# Patient Record
Sex: MALE | Race: WHITE | URBAN - METROPOLITAN AREA
[De-identification: names, ages, dates, MRNs, and addresses within clinical notes are randomized per-mention and may not be internally consistent; named-entity substitution may affect disease eponyms.]

---

## 2022-09-02 ENCOUNTER — HOSPITAL ENCOUNTER (INPATIENT)
Age: 32
LOS: 5 days | Discharge: HOME | DRG: 885 | End: 2022-09-07
Attending: PSYCHIATRY & NEUROLOGY | Admitting: PSYCHIATRY & NEUROLOGY
Payer: MEDICARE

## 2022-09-02 PROBLEM — F29 PSYCHOSIS, UNSPECIFIED PSYCHOSIS TYPE (HCC): Status: ACTIVE | Noted: 2022-09-02

## 2022-09-02 PROCEDURE — 99221 1ST HOSP IP/OBS SF/LOW 40: CPT

## 2022-09-02 PROCEDURE — 6370000000 HC RX 637 (ALT 250 FOR IP)

## 2022-09-02 PROCEDURE — 1240000000 HC EMOTIONAL WELLNESS R&B

## 2022-09-02 RX ORDER — DIPHENHYDRAMINE HYDROCHLORIDE 50 MG/ML
50 INJECTION INTRAMUSCULAR; INTRAVENOUS EVERY 4 HOURS PRN
Status: DISCONTINUED | OUTPATIENT
Start: 2022-09-02 | End: 2022-09-07 | Stop reason: HOSPADM

## 2022-09-02 RX ORDER — ERYTHROMYCIN 5 MG/G
50 OINTMENT OPHTHALMIC 4 TIMES DAILY
Status: ON HOLD | COMMUNITY
Start: 2022-08-30 | End: 2022-09-07 | Stop reason: HOSPADM

## 2022-09-02 RX ORDER — CARBOXYMETHYLCELLULOSE SODIUM 10 MG/ML
1 GEL OPHTHALMIC EVERY 4 HOURS PRN
Status: DISCONTINUED | OUTPATIENT
Start: 2022-09-02 | End: 2022-09-07 | Stop reason: HOSPADM

## 2022-09-02 RX ORDER — CIPROFLOXACIN HYDROCHLORIDE 3.5 MG/ML
1 SOLUTION/ DROPS TOPICAL 3 TIMES DAILY
Status: DISCONTINUED | OUTPATIENT
Start: 2022-09-02 | End: 2022-09-07 | Stop reason: HOSPADM

## 2022-09-02 RX ORDER — TRAZODONE HYDROCHLORIDE 50 MG/1
50 TABLET ORAL NIGHTLY PRN
Status: DISCONTINUED | OUTPATIENT
Start: 2022-09-02 | End: 2022-09-07 | Stop reason: HOSPADM

## 2022-09-02 RX ORDER — HYDROXYZINE 50 MG/1
50 TABLET, FILM COATED ORAL 3 TIMES DAILY PRN
Status: DISCONTINUED | OUTPATIENT
Start: 2022-09-02 | End: 2022-09-07 | Stop reason: HOSPADM

## 2022-09-02 RX ORDER — IBUPROFEN 400 MG/1
400 TABLET ORAL EVERY 6 HOURS PRN
Status: DISCONTINUED | OUTPATIENT
Start: 2022-09-02 | End: 2022-09-07 | Stop reason: HOSPADM

## 2022-09-02 RX ORDER — MAGNESIUM HYDROXIDE/ALUMINUM HYDROXICE/SIMETHICONE 120; 1200; 1200 MG/30ML; MG/30ML; MG/30ML
30 SUSPENSION ORAL EVERY 6 HOURS PRN
Status: DISCONTINUED | OUTPATIENT
Start: 2022-09-02 | End: 2022-09-07 | Stop reason: HOSPADM

## 2022-09-02 RX ORDER — OLANZAPINE 5 MG/1
5 TABLET ORAL EVERY 6 HOURS PRN
Status: DISCONTINUED | OUTPATIENT
Start: 2022-09-02 | End: 2022-09-07 | Stop reason: HOSPADM

## 2022-09-02 RX ORDER — POLYETHYLENE GLYCOL 3350 17 G
2 POWDER IN PACKET (EA) ORAL
Status: DISCONTINUED | OUTPATIENT
Start: 2022-09-02 | End: 2022-09-07 | Stop reason: HOSPADM

## 2022-09-02 RX ADMIN — CIPROFLOXACIN 1 DROP: 3 SOLUTION OPHTHALMIC at 20:15

## 2022-09-02 RX ADMIN — OLANZAPINE 5 MG: 5 TABLET, FILM COATED ORAL at 21:05

## 2022-09-02 ASSESSMENT — SLEEP AND FATIGUE QUESTIONNAIRES
DO YOU HAVE DIFFICULTY SLEEPING: YES
SLEEP PATTERN: DIFFICULTY FALLING ASLEEP;DISTURBED/INTERRUPTED SLEEP;INSOMNIA;RESTLESSNESS
DO YOU USE A SLEEP AID: YES
AVERAGE NUMBER OF SLEEP HOURS: 1.5

## 2022-09-02 ASSESSMENT — LIFESTYLE VARIABLES
HOW OFTEN DO YOU HAVE A DRINK CONTAINING ALCOHOL: 2-4 TIMES A MONTH
HOW MANY STANDARD DRINKS CONTAINING ALCOHOL DO YOU HAVE ON A TYPICAL DAY: 1 OR 2

## 2022-09-02 NOTE — PROGRESS NOTES
`[facility]  Admission Note     Admission Type:   Admission Type:  Involuntary    Reason for admission:  Reason for Admission: Schizophrenia, Medication non compliance    PATIENT STRENGTHS:       Patient Strengths and Limitations: Non compliant       Addictive Behavior:   Addictive Behavior  In the Past 3 Months, Have You Felt or Has Someone Told You That You Have a Problem With  : None    Medical Problems:   Past Medical History:   Diagnosis Date    Poly-drug misuser     Schizophrenia ([facility] [address]        Status EXAM:  Mental Status and Behavioral Exam  Normal: No  Level of Assistance: Independent/Self  Facial Expression: Avoids Gaze  Affect: Incongruent, Unstable  Level of Consciousness: Alert  Frequency of Checks: 4 times per hour, close  Mood:Normal: No  Mood: Anxious, Suspicious, Irritable  Motor Activity:Normal: Yes  Eye Contact: Poor  Observed Behavior: Guarded, Preoccupied  Sexual Misconduct History: Current - no  Preception: [place] to person  Attention:Normal: No  Attention: Unable to concentrate  Thought Processes: Flight of ideas, Loose association  Thought Content:Normal: No  Thought Content: Delusions  Anxiety Symptoms: Generalized  Layla Symptoms: Flight of ideas  Hallucinations: None  Delusions: Yes  Delusions: Grandeur  Memory:Normal: No  Memory: Poor recent, Poor remote  Insight and Judgment: No  Insight and Judgment: Poor judgment, Poor insight, Unrealistic    Tobacco Screening:  Practical Counseling, on admission, kwadwo X, if applicable and completed (first 3 are required if patient doesn't refuse):            ( )  Recognizing danger situations (included triggers and roadblocks)                    ( )  Coping skills (new ways to manage stress, exercise, relaxation techniques, changing routine, distraction)                                                           ( )  Basic information about quitting (benefits of quitting, techniques in how to quit, available resources  ( ) Referral for counseling faxed to [facility]                                           ( ) Patient refused counseling  ( ) Patient has not smoked in the last 30 days    Metabolic Screening:    No results found for: LABA1C    No results for input(s): CHOL, TRIG, HDL, LDLCALC, LABVLDL in the last 72 hours. There is no height or weight on file to calculate BMI. BP Readings from Last 2 Encounters:   09/02/22 (!) 133/99   05/22/18 122/67           Pt admitted with followings belongings:  Dental Appliances: None  Vision - Corrective Lenses: Contact Lenses (no currently with patient)  Hearing Aid: None  Jewelry: None  Body Piercings Removed: N/A  Clothing: Pants, Socks  Other Valuables: Anjali Thayer, Cigarettes, Other (Comment) (a single dollarbill  and state ID)     Valuables placed in safe  Patient's home medications were not verified awaiting response. Patient oriented to surroundings and program expectations and copy of patient rights given. Received admission packet:  Consents reviewed,  not signed .  Refused   Travis Dempsey RN
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
## 2022-09-02 NOTE — PROGRESS NOTES
Called Charan Yanes to get information about pts pharmacy and home meds as well as gather Collateral awaiting call back

## 2022-09-03 PROBLEM — F20.3 UNDIFFERENTIATED SCHIZOPHRENIA (HCC): Status: ACTIVE | Noted: 2022-09-03

## 2022-09-03 LAB
CHOLESTEROL, TOTAL: 134 MG/DL (ref 0–199)
HDLC SERPL-MCNC: 38 MG/DL (ref 40–60)
LDL CHOLESTEROL CALCULATED: 80 MG/DL
TRIGL SERPL-MCNC: 78 MG/DL (ref 0–150)
TSH SERPL DL<=0.05 MIU/L-ACNC: 0.77 UIU/ML (ref 0.27–4.2)
VLDLC SERPL CALC-MCNC: 16 MG/DL

## 2022-09-03 PROCEDURE — 84443 ASSAY THYROID STIM HORMONE: CPT

## 2022-09-03 PROCEDURE — 6370000000 HC RX 637 (ALT 250 FOR IP): Performed by: PSYCHIATRY & NEUROLOGY

## 2022-09-03 PROCEDURE — 6370000000 HC RX 637 (ALT 250 FOR IP)

## 2022-09-03 PROCEDURE — 99223 1ST HOSP IP/OBS HIGH 75: CPT | Performed by: PSYCHIATRY & NEUROLOGY

## 2022-09-03 PROCEDURE — 83036 HEMOGLOBIN GLYCOSYLATED A1C: CPT

## 2022-09-03 PROCEDURE — 36415 COLL VENOUS BLD VENIPUNCTURE: CPT

## 2022-09-03 PROCEDURE — 6360000002 HC RX W HCPCS: Performed by: PSYCHIATRY & NEUROLOGY

## 2022-09-03 PROCEDURE — 1240000000 HC EMOTIONAL WELLNESS R&B

## 2022-09-03 PROCEDURE — 80061 LIPID PANEL: CPT

## 2022-09-03 RX ORDER — FLUPHENAZINE DECANOATE 25 MG/ML
50 INJECTION, SOLUTION INTRAMUSCULAR; SUBCUTANEOUS ONCE
Status: COMPLETED | OUTPATIENT
Start: 2022-09-03 | End: 2022-09-03

## 2022-09-03 RX ORDER — BUPROPION HYDROCHLORIDE 150 MG/1
150 TABLET ORAL DAILY
Status: DISCONTINUED | OUTPATIENT
Start: 2022-09-03 | End: 2022-09-07 | Stop reason: HOSPADM

## 2022-09-03 RX ORDER — BENZTROPINE MESYLATE 1 MG/1
1 TABLET ORAL 2 TIMES DAILY
Status: DISCONTINUED | OUTPATIENT
Start: 2022-09-03 | End: 2022-09-03

## 2022-09-03 RX ORDER — FLUPHENAZINE DECANOATE 25 MG/ML
25 INJECTION, SOLUTION INTRAMUSCULAR; SUBCUTANEOUS ONCE
Status: DISCONTINUED | OUTPATIENT
Start: 2022-09-03 | End: 2022-09-03

## 2022-09-03 RX ORDER — OLANZAPINE 10 MG/1
10 TABLET ORAL 2 TIMES DAILY
Status: DISCONTINUED | OUTPATIENT
Start: 2022-09-03 | End: 2022-09-07 | Stop reason: HOSPADM

## 2022-09-03 RX ORDER — BENZTROPINE MESYLATE 1 MG/1
2 TABLET ORAL NIGHTLY
Status: DISCONTINUED | OUTPATIENT
Start: 2022-09-03 | End: 2022-09-07 | Stop reason: HOSPADM

## 2022-09-03 RX ORDER — BENZTROPINE MESYLATE 1 MG/1
1 TABLET ORAL DAILY
Status: DISCONTINUED | OUTPATIENT
Start: 2022-09-04 | End: 2022-09-07 | Stop reason: HOSPADM

## 2022-09-03 RX ORDER — FLUPHENAZINE HYDROCHLORIDE 5 MG/1
5 TABLET ORAL 2 TIMES DAILY
Status: DISCONTINUED | OUTPATIENT
Start: 2022-09-03 | End: 2022-09-03

## 2022-09-03 RX ORDER — FLUPHENAZINE HYDROCHLORIDE 10 MG/1
10 TABLET ORAL NIGHTLY
Status: DISCONTINUED | OUTPATIENT
Start: 2022-09-03 | End: 2022-09-05

## 2022-09-03 RX ADMIN — CIPROFLOXACIN 1 DROP: 3 SOLUTION OPHTHALMIC at 08:49

## 2022-09-03 RX ADMIN — OLANZAPINE 10 MG: 10 TABLET, FILM COATED ORAL at 20:44

## 2022-09-03 RX ADMIN — BUPROPION HYDROCHLORIDE 150 MG: 150 TABLET ORAL at 16:06

## 2022-09-03 RX ADMIN — OLANZAPINE 5 MG: 5 TABLET, FILM COATED ORAL at 08:49

## 2022-09-03 RX ADMIN — FLUPHENAZINE DECANOATE 50 MG: 25 INJECTION, SOLUTION INTRAMUSCULAR; SUBCUTANEOUS at 11:24

## 2022-09-03 RX ADMIN — CIPROFLOXACIN 1 DROP: 3 SOLUTION OPHTHALMIC at 16:06

## 2022-09-03 RX ADMIN — FLUPHENAZINE HYDROCHLORIDE 10 MG: 10 TABLET, FILM COATED ORAL at 20:44

## 2022-09-03 RX ADMIN — NICOTINE POLACRILEX 2 MG: 2 LOZENGE ORAL at 08:49

## 2022-09-03 RX ADMIN — CARBOXYMETHYLCELLULOSE SODIUM 1 DROP: 10 GEL OPHTHALMIC at 18:30

## 2022-09-03 RX ADMIN — OLANZAPINE 5 MG: 5 TABLET, FILM COATED ORAL at 21:00

## 2022-09-03 RX ADMIN — HYDROXYZINE HYDROCHLORIDE 50 MG: 50 TABLET, FILM COATED ORAL at 19:36

## 2022-09-03 RX ADMIN — CIPROFLOXACIN 1 DROP: 3 SOLUTION OPHTHALMIC at 21:24

## 2022-09-03 RX ADMIN — OLANZAPINE 10 MG: 10 TABLET, FILM COATED ORAL at 16:06

## 2022-09-03 RX ADMIN — TRAZODONE HYDROCHLORIDE 50 MG: 50 TABLET ORAL at 20:44

## 2022-09-03 RX ADMIN — BENZTROPINE MESYLATE 2 MG: 1 TABLET ORAL at 21:00

## 2022-09-03 ASSESSMENT — PAIN SCALES - GENERAL: PAINLEVEL_OUTOF10: 0

## 2022-09-03 NOTE — PROGRESS NOTES
Behavioral Services  Medicare Certification Upon Admission    I certify that this patient's inpatient psychiatric hospital admission is medically necessary for:    [x] (1) Treatment which could reasonably be expected to improve this patient's condition,       [x] (2) Or for diagnostic study;     AND     [x](2) The inpatient psychiatric services are provided while the individual is under the care of a physician and are included in the individualized plan of care.     Estimated length of stay/service 3 d    Plan for post-hospital care outpt    Electronically signed by Caryn Corona MD on 9/3/2022 at 12:07 PM

## 2022-09-03 NOTE — PLAN OF CARE
Pt alert and oriented to self and time. He does not know where he is, except that he is in a hospital. He is anxious at times and requested Zyprexa. It was effective. Pt requested his Prolixin Decoanate and it was admin to his left deltoid. He laughs inappropriately at times. He is disorganized with flight of ideas. He denies AVH and has not been noted to be RTIS. He denies SI/HI. Problem: Anxiety  Goal: Will report anxiety at manageable levels  Description: INTERVENTIONS:  1. Administer medication as ordered  2. Teach and rehearse alternative coping skills  3. Provide emotional support with 1:1 interaction with staff  Outcome: Not Progressing  Flowsheets (Taken 9/3/2022 1307)  Will report anxiety at manageable levels:   Administer medication as ordered   Provide emotional support with 1:1 interaction with staff   Teach and rehearse alternative coping skills     Problem: Confusion  Goal: Confusion, delirium, dementia, or psychosis is improved or at baseline  Description: INTERVENTIONS:  1. Assess for possible contributors to thought disturbance, including medications, impaired vision or hearing, underlying metabolic abnormalities, dehydration, psychiatric diagnoses, and notify attending LIP  2. [place] high risk fall precautions, as indicated  3. Provide frequent short contacts to provide reality reorientation, refocusing and direction  4. Decrease environmental stimuli, including noise as appropriate  5. Monitor and intervene to maintain adequate nutrition, hydration, elimination, sleep and activity  6. If unable to ensure safety without constant attention obtain sitter and review sitter guidelines with assigned personnel  7.  Initiate Psychosocial CNS and Spiritual Care consult, as indicated  Outcome: Not Progressing  Flowsheets (Taken 9/3/2022 1307)  Effect of thought disturbance (confusion, delirium, dementia, or psychosis) are managed with adequate functional status: Assess for contributors to thought disturbance, including medications, impaired vision or hearing, underlying metabolic abnormalities, dehydration, psychiatric diagnoses, notify LIP     Problem: Infection - Adult  Goal: Absence of infection at discharge  Outcome: Not Progressing  Flowsheets (Taken 9/3/2022 1307)  Absence of infection at discharge:   Administer medications as ordered   Assess and monitor for signs and symptoms of infection   Instruct and encourage patient and family to use good hand hygiene technique  Goal: Absence of fever/infection during anticipated neutropenic period  Outcome: Not Progressing

## 2022-09-03 NOTE — PLAN OF CARE
Problem: Anxiety  Goal: Will report anxiety at manageable levels  Description: INTERVENTIONS:  1. Administer medication as ordered  2. Teach and rehearse alternative coping skills  3. Provide emotional support with 1:1 interaction with staff  Outcome: Progressing     Problem: Coping  Goal: Pt/Family able to verbalize concerns and demonstrate effective coping strategies  Description: INTERVENTIONS:  1. Assist patient/family to identify coping skills, available support systems and cultural and spiritual values  2. Provide emotional support, including active listening and acknowledgement of concerns of patient and caregivers  3. Reduce environmental stimuli, as able  4. Instruct patient/family in relaxation techniques, as appropriate  5. Assess for spiritual pain/suffering and initiate Spiritual Care, Psychosocial Clinical Specialist consults as needed  Outcome: Progressing     Problem: Behavior  Goal: Pt/Family maintain appropriate behavior and adhere to behavioral management agreement, if implemented  Description: INTERVENTIONS:  1. Assess patient/family's coping skills and  non-compliant behavior (including use of illegal substances)  2. Notify security of behavior or suspected illegal substances which indicate the need for search of the family and/or belongings  3. Encourage verbalization of thoughts and concerns in a socially appropriate manner  4. Utilize positive, consistent limit setting strategies supporting safety of patient, staff and others  5. Encourage participation in the decision making process about the behavioral management agreement  6. If a visitor's behavior poses a threat to safety call refer to organization policy. 7. Initiate consult with , Psychosocial CNS, Spiritual Care as appropriate  Outcome: Progressing     Problem: Depression/Self Harm  Goal: Effect of psychiatric condition will be minimized and patient will be protected from self harm  Description: INTERVENTIONS:  1. Assess impact of patient's symptoms on level of functioning, self care needs and offer support as indicated  2. Assess patient/family knowledge of depression, impact on illness and need for teaching  3. Provide emotional support, presence and reassurance  4. Assess for possible suicidal thoughts or ideation. If patient expresses suicidal thoughts or statements do not leave alone, initiate Suicide Precautions, move to a room close to the nursing station and obtain sitter  5. Initiate consults as appropriate with Mental Health Professional, Spiritual Care, Psychosocial CNS, and consider a recommendation to the LIP for a Psychiatric Consultation  Outcome: Progressing     Problem: Decision Making  Goal: Pt/Family able to effectively weigh alternatives and participate in decision making related to treatment and care  Description: INTERVENTIONS:  1. Determine when there are differences between patient's view, family's view, and healthcare provider's view of condition  2. Facilitate patient and family articulation of goals for care  3. Help patient and family identify pros/cons of alternative solutions  4. Provide information as requested by patient/family  5. Respect patient/family right to receive or not to receive information  6. Serve as a liaison between patient and family and health care team  7. Initiate Consults from Ethics, Palliative Care or initiate [facility] as is appropriate  Outcome: Not Progressing     Problem: Confusion  Goal: Confusion, delirium, dementia, or psychosis is improved or at baseline  Description: INTERVENTIONS:  1. Assess for possible contributors to thought disturbance, including medications, impaired vision or hearing, underlying metabolic abnormalities, dehydration, psychiatric diagnoses, and notify attending LIP  2. [place] high risk fall precautions, as indicated  3. Provide frequent short contacts to provide reality reorientation, refocusing and direction  4.  Decrease environmental stimuli, including noise as appropriate  5. Monitor and intervene to maintain adequate nutrition, hydration, elimination, sleep and activity  6. If unable to ensure safety without constant attention obtain sitter and review sitter guidelines with assigned personnel  7. Initiate Psychosocial CNS and Spiritual Care consult, as indicated  Outcome: Not Progressing   Pt blunt, cooperative. Isolative to self and med compliant. Administered first eye drops for pinkeye. Reminded about good hand hygiene due to infection. CFS, denies all.

## 2022-09-04 LAB
ESTIMATED AVERAGE GLUCOSE: 105.4 MG/DL
HBA1C MFR BLD: 5.3 %

## 2022-09-04 PROCEDURE — 6370000000 HC RX 637 (ALT 250 FOR IP)

## 2022-09-04 PROCEDURE — 6360000002 HC RX W HCPCS

## 2022-09-04 PROCEDURE — 6370000000 HC RX 637 (ALT 250 FOR IP): Performed by: PSYCHIATRY & NEUROLOGY

## 2022-09-04 PROCEDURE — 1240000000 HC EMOTIONAL WELLNESS R&B

## 2022-09-04 RX ORDER — LORAZEPAM 2 MG/1
2 TABLET ORAL ONCE
Status: COMPLETED | OUTPATIENT
Start: 2022-09-04 | End: 2022-09-04

## 2022-09-04 RX ORDER — POLYETHYLENE GLYCOL 3350 17 G/17G
17 POWDER, FOR SOLUTION ORAL DAILY
Status: DISCONTINUED | OUTPATIENT
Start: 2022-09-04 | End: 2022-09-07 | Stop reason: HOSPADM

## 2022-09-04 RX ADMIN — BENZTROPINE MESYLATE 2 MG: 1 TABLET ORAL at 20:13

## 2022-09-04 RX ADMIN — BUPROPION HYDROCHLORIDE 150 MG: 150 TABLET ORAL at 08:28

## 2022-09-04 RX ADMIN — OLANZAPINE 10 MG: 10 TABLET, FILM COATED ORAL at 20:13

## 2022-09-04 RX ADMIN — LORAZEPAM 2 MG: 2 TABLET ORAL at 20:13

## 2022-09-04 RX ADMIN — TRAZODONE HYDROCHLORIDE 50 MG: 50 TABLET ORAL at 20:13

## 2022-09-04 RX ADMIN — CIPROFLOXACIN 1 DROP: 3 SOLUTION OPHTHALMIC at 16:00

## 2022-09-04 RX ADMIN — HYDROXYZINE HYDROCHLORIDE 50 MG: 50 TABLET, FILM COATED ORAL at 11:26

## 2022-09-04 RX ADMIN — POLYETHYLENE GLYCOL (3350) 17 G: 17 POWDER, FOR SOLUTION ORAL at 12:02

## 2022-09-04 RX ADMIN — OLANZAPINE 5 MG: 5 TABLET, FILM COATED ORAL at 16:00

## 2022-09-04 RX ADMIN — NICOTINE POLACRILEX 2 MG: 2 LOZENGE ORAL at 11:34

## 2022-09-04 RX ADMIN — BENZTROPINE MESYLATE 1 MG: 1 TABLET ORAL at 08:28

## 2022-09-04 RX ADMIN — DIPHENHYDRAMINE HYDROCHLORIDE 50 MG: 50 INJECTION, SOLUTION INTRAMUSCULAR; INTRAVENOUS at 19:00

## 2022-09-04 RX ADMIN — CIPROFLOXACIN 1 DROP: 3 SOLUTION OPHTHALMIC at 08:29

## 2022-09-04 RX ADMIN — CIPROFLOXACIN 1 DROP: 3 SOLUTION OPHTHALMIC at 20:23

## 2022-09-04 RX ADMIN — FLUPHENAZINE HYDROCHLORIDE 10 MG: 10 TABLET, FILM COATED ORAL at 20:13

## 2022-09-04 RX ADMIN — OLANZAPINE 10 MG: 10 TABLET, FILM COATED ORAL at 08:28

## 2022-09-04 ASSESSMENT — PAIN SCALES - GENERAL: PAINLEVEL_OUTOF10: 0

## 2022-09-04 NOTE — PROGRESS NOTES
Patient was on the small side of the unit and utilized the phone. After phone call patient began pacing the unit, yelling he was becoming agitated and feeling violet. Nurse on small side guided him to large side to find his primary nurse. Patient became even more upset that nurse was following him. Patient on big side continuing to pace the unit, yelling, \"I am agitated, and feeling violet\". Distraction techniques utilized but were unsuccessful. Patient had Zyprexa at 1600. Patient requesting IM Thorazine. Thorazine not available for patient at this time. Benadryl IM offered and  patient did agree to this, but stated, \"Benadryl doesn't usually work for me. \"  IM Benadryl given per order. Provider notified. One time verbal order for Ativan 2 mg PO given.   See [facility]

## 2022-09-04 NOTE — PROGRESS NOTES
Time: 2100      Type of Group: wrap up group      Level of Participation: laughed inappropriately during group. Somewhat intrusive and did not pay attention to other speaking. Did not share goal but stayed in mileu during group time.       Comments: see above

## 2022-09-04 NOTE — H&P
[address]                 [address] [address]                              HISTORY AND PHYSICAL    PATIENT NAME: Rodri Flanagan                  :        1990  MED REC NO:   [id number]                          ROOM:       [id number]  ACCOUNT NO:   [de-identified]                           ADMIT DATE: 2022  PROVIDER:     Vince Pickett. Tre Galvez MD    CHIEF COMPLAINT:  Psychosis. HISTORY OF PRESENT ILLNESS:  The patient is a 82-year-old male with a  history of schizophrenia and lives in [place], [place]. He was  brought to [facility] by police after he had some type of an  outburst in a front yard, screaming, was going to rip off people's  heads. He was seen in the ED and was cleared and transferred to [facility] for  treatment. When I asked the patient about why he was at [facility] when he lives in  [place], he stated that he came there and was visiting family in [place]. It is not clear as to the details of that. Police had to come and get  him from that area for his behaviors. He stated that he has been in  outpatient treatment through [facility] and sees Dr. Mercedes Liao,  a psychiatrist, and is prescribed a medication. Apparently, he has been  off this medication for a month or so and has not followed up in  treatment as expected. The patient has minimal insight. He did not appear to understand why he  was in the hospital or what was going on that caused him to be in the  hospital.  He would act rather oddly at times and would suddenly burst  out laughing. When I asked him about drug use and alcohol use, he said  that he \"drank glue. \"  When I questioned that, he burst out laughing  very loudly and then said he was joking. He apparently does not have  any history of drug and alcohol abuse. MEDICAL HISTORY:  States he is healthy.     PRIOR PSYCHIATRIC TREATMENT:  Inpatient, he states he has been to  [facility] but not clear as to the details. He  had a difficult time giving information. Apparently, he has also been  to [address] for mental health treatment. Outpatient,  [facility] in [place], [place] with Dr. Martine Rivers. LEGAL ISSUES:  He denied. TRAUMA HISTORY:  He denied. SOCIAL HISTORY:  He lives in [place], [place] in an apartment. He  has been there for two years. REVIEW OF SYSTEMS: Pertinent positives on the HPI, otherwise negative. ALLERGIES:  HYDROCODONE. FAMILY PSYCH HISTORY:  Mother with depression. Mother with substance  abuse history. Brother with substance abuse history. VITAL SIGNS:  Temperature 98.1, pulse 105, respirations 18, blood  pressure 133/99. LABORATORIES:  Reviewed. Urine drug screen was negative. No alcohol. White count was 13.5. CURRENT MEDICATIONS:  Cogentin 1 mg in the morning and 2 mg at night,  Prilosec 40 mg in the morning. He takes Prolixin Decanoate 50 mg every  month IM, Prolixin 10 mg p.o. b.i.d., Zyprexa 10 mg daily. PAST MEDICATIONS:  Include Depakote, Risperdal, Seroquel, which he  denied taking at this time. These were verified through [facility]. DRUG AND ALCOHOL USE:  He apparently has a history of polysubstance  abuse, but not currently. MENTAL STATUS EXAMINATION:  The patient is a 46-year-old male. He  appears well nourished. He is in hospital gown. He made good eye  contact. Speech was normal rate and tone. Thoughts were relatively  logical at times, although he would go off on tangents of topics. He  denied any threats to harm self or others. Denied auditory or visual  hallucinations. Insight and judgment is impaired. Fund of knowledge  and language was fair. Attention and concentration was poor. He said  his mood was okay. Affect was labile with periods of silliness. DIAGNOSES:  Axis I:  1. Schizophrenia, undifferentiated type.   2.  Polysubstance abuse, in remission. Axis II:  Deferred. Axis III:  Unremarkable. Axis IV:  Severe. Axis V:  35. PLAN:  1. We will restart medication. Will receive 50 mg Prolixin Decanoate  today and continue that monthly. Prolixin p.o. 10 mg b.i.d., Zyprexa 10  mg daily, Cogentin 1 in the morning and 2 mg at night. 2.  In full-program.  3.  Goal for discharge is for the patient to be compliant with treatment  and show an improvement with overall function. 4.  Follow up in outpatient through [facility] with Dr. Tony Trammell. 5.  Estimated length of stay, 2-3 days. Spent approximately 70 minutes on this eval with more than 50% of the  time discussing patient care and treatment options.         Lafe Snellen, MD    D: 09/03/2022 17:01:13       T: 09/03/2022 17:06:24     [id number]  Job#: [id number]     Doc#: [id number]    CC:

## 2022-09-04 NOTE — PROGRESS NOTES
Patient came up to staff and informed them that he was tired. He was reminded to go to bed for sleep. Once activities ended for the night, Nitin Rush went straight to his room and fell asleep quickly. Medications are noted to be effective.

## 2022-09-04 NOTE — PROGRESS NOTES
Patient was noted to be in bed at this time. His eyes are closed and he is resting quietly in bed. Medication was finally effective. Patient enjoyed being with peers in the TV area, was getting tired but didn't want to go to bed until the activities of the day were completed.

## 2022-09-04 NOTE — PLAN OF CARE
Problem: Anxiety  Goal: Will report anxiety at manageable levels  Description: INTERVENTIONS:  1. Administer medication as ordered  2. Teach and rehearse alternative coping skills  3. Provide emotional support with 1:1 interaction with staff  Outcome: Progressing     Problem: Coping  Goal: Pt/Family able to verbalize concerns and demonstrate effective coping strategies  Description: INTERVENTIONS:  1. Assist patient/family to identify coping skills, available support systems and cultural and spiritual values  2. Provide emotional support, including active listening and acknowledgement of concerns of patient and caregivers  3. Reduce environmental stimuli, as able  4. Instruct patient/family in relaxation techniques, as appropriate  5. Assess for spiritual pain/suffering and initiate Spiritual Care, Psychosocial Clinical Specialist consults as needed  Outcome: Progressing     Problem: Confusion  Goal: Confusion, delirium, dementia, or psychosis is improved or at baseline  Description: INTERVENTIONS:  1. Assess for possible contributors to thought disturbance, including medications, impaired vision or hearing, underlying metabolic abnormalities, dehydration, psychiatric diagnoses, and notify attending LIP  2. [place] high risk fall precautions, as indicated  3. Provide frequent short contacts to provide reality reorientation, refocusing and direction  4. Decrease environmental stimuli, including noise as appropriate  5. Monitor and intervene to maintain adequate nutrition, hydration, elimination, sleep and activity  6. If unable to ensure safety without constant attention obtain sitter and review sitter guidelines with assigned personnel  7. Initiate Psychosocial CNS and Spiritual Care consult, as indicated  Outcome: Progressing     Problem: Depression/Self Harm  Goal: Effect of psychiatric condition will be minimized and patient will be protected from self harm  Description: INTERVENTIONS:  1.  Assess impact of

## 2022-09-04 NOTE — PLAN OF CARE
Pt is alert and oriented X3. He is compliant with medications this am. He states he does not know why he is here. He has a flat affect at times and is slow to answer questions. He is irritable at times and requested \"something to make my legs stop walking. Atarax was admin. Pt c/o constipation and MD was notified. Miralax ordered and admin. He denies SI/HI/AVH, and has not been noted to be RTIS. Problem: Anxiety  Goal: Will report anxiety at manageable levels  Description: INTERVENTIONS:  1. Administer medication as ordered  2. Teach and rehearse alternative coping skills  3.  Provide emotional support with 1:1 interaction with staff  9/4/2022 1118 by Yojana Rivera RN  Outcome: Not Progressing  Flowsheets (Taken 9/4/2022 1110)  Will report anxiety at manageable levels:   Administer medication as ordered   Provide emotional support with 1:1 interaction with staff   Teach and rehearse alternative coping skills  9/4/2022 0549 by Yakov Castro RN  Outcome: Progressing

## 2022-09-04 NOTE — PROGRESS NOTES
Patient had been noted to come out of room, walk into dining room and standing there and growling at peers or laughing in a dramatic fashion. He was medication compliant, he requested additional zyprexa 5 mg po at 2100 to help with his agitation. He was provided this, was able to stay in dayroom and attended group and watch TV with peers. No additional behavior noted from patient. Medication was noted to be effective.

## 2022-09-05 PROCEDURE — 6370000000 HC RX 637 (ALT 250 FOR IP)

## 2022-09-05 PROCEDURE — 99233 SBSQ HOSP IP/OBS HIGH 50: CPT | Performed by: PSYCHIATRY & NEUROLOGY

## 2022-09-05 PROCEDURE — 1240000000 HC EMOTIONAL WELLNESS R&B

## 2022-09-05 PROCEDURE — 6370000000 HC RX 637 (ALT 250 FOR IP): Performed by: PSYCHIATRY & NEUROLOGY

## 2022-09-05 RX ORDER — PROPRANOLOL HYDROCHLORIDE 10 MG/1
10 TABLET ORAL 3 TIMES DAILY
Status: DISCONTINUED | OUTPATIENT
Start: 2022-09-05 | End: 2022-09-07 | Stop reason: HOSPADM

## 2022-09-05 RX ORDER — FLUPHENAZINE HYDROCHLORIDE 10 MG/1
10 TABLET ORAL DAILY
Status: DISCONTINUED | OUTPATIENT
Start: 2022-09-06 | End: 2022-09-07 | Stop reason: HOSPADM

## 2022-09-05 RX ORDER — FLUPHENAZINE HYDROCHLORIDE 10 MG/1
10 TABLET ORAL 2 TIMES DAILY
Status: DISCONTINUED | OUTPATIENT
Start: 2022-09-05 | End: 2022-09-05

## 2022-09-05 RX ADMIN — CIPROFLOXACIN 1 DROP: 3 SOLUTION OPHTHALMIC at 09:50

## 2022-09-05 RX ADMIN — POLYETHYLENE GLYCOL (3350) 17 G: 17 POWDER, FOR SOLUTION ORAL at 09:09

## 2022-09-05 RX ADMIN — PROPRANOLOL HYDROCHLORIDE 10 MG: 10 TABLET ORAL at 13:39

## 2022-09-05 RX ADMIN — BENZTROPINE MESYLATE 1 MG: 1 TABLET ORAL at 09:07

## 2022-09-05 RX ADMIN — TRAZODONE HYDROCHLORIDE 50 MG: 50 TABLET ORAL at 20:07

## 2022-09-05 RX ADMIN — OLANZAPINE 5 MG: 5 TABLET, FILM COATED ORAL at 22:26

## 2022-09-05 RX ADMIN — OLANZAPINE 10 MG: 10 TABLET, FILM COATED ORAL at 20:08

## 2022-09-05 RX ADMIN — BENZTROPINE MESYLATE 2 MG: 1 TABLET ORAL at 20:07

## 2022-09-05 RX ADMIN — OLANZAPINE 5 MG: 5 TABLET, FILM COATED ORAL at 11:57

## 2022-09-05 RX ADMIN — PROPRANOLOL HYDROCHLORIDE 10 MG: 10 TABLET ORAL at 20:07

## 2022-09-05 RX ADMIN — CIPROFLOXACIN 1 DROP: 3 SOLUTION OPHTHALMIC at 20:08

## 2022-09-05 RX ADMIN — OLANZAPINE 10 MG: 10 TABLET, FILM COATED ORAL at 09:08

## 2022-09-05 RX ADMIN — CIPROFLOXACIN 1 DROP: 3 SOLUTION OPHTHALMIC at 16:58

## 2022-09-05 RX ADMIN — BUPROPION HYDROCHLORIDE 150 MG: 150 TABLET ORAL at 09:09

## 2022-09-05 NOTE — PROGRESS NOTES
Department of Psychiatry  AttendingProgress Note    Patient says he is fine today but has been paranoid about peers. Appears paranoid about this writer. The treatment team met and discussed the treatment plan. SUBJECTIVE:        Suicidal ideation:  denies suicidal ideation. Patient does not have medication side effects. ROS: Patient has new complaints: no  Sleeping adequately:  Having episodes of agitation  Appetite adequate: Yes  Attending groups: No:       OBJECTIVE    Physical  Vitals:    Blood pressure (!) 103/51, pulse (!) 107, temperature 97.7 °F (36.5 °C), temperature source Temporal, resp. rate 16, height 5' 9\" (1.753 m), weight 204 lb (92.5 kg), SpO2 99 %.   General appearance:  [x]  appears age, []  appears older than stated age,     [x]  adequately dressed and groomed, [] disheveled,                []  in no acute distress, [x] appears mildly distressed, paranoid      []  Other:      MUSCULOSKELETAL:   Gait:   [] normal, [] antalgic, [] unsteady, [x] in bed, gait not evaluated   Station:  [] erect, [] sitting, [x] recumbent, [] other        Strength/tone:  [x] muscle strength and tone appear consistent with age and condition     [] atrophy      [] abnormal movements  PSYCHIATRIC:    Relatedness:  [] cooperative, [x] guarded, paranoid [] indifferent, [] hostile,      [] sedated  Speech:  [] normal prosody, [] pressured, [x] decreased volume,    [] slurred, [x] halting, [] slowed, [] loud     [] echolalia, [] incoherent, [] stuttering   Eye contact:  [] direct, [x] avoidant, [] intense  Kinetics:  [] normal, [] increased, [x] decreased  Mood:   [] euthymic, [] depressed, [x] anxious, [] irritable,     [] labile  Affect:   [] normal range, [] constricted, [] depressed, [] anxious,     [] angry, [x] blunted, [] flat  Hallucinations  [x] denies, [] auditory,  [] visual,  [] olfactory, [] tactile  Delusions  [] none, [] grandiose,  [] jealous,  [x] persecutory,  [] somatic,     [] bizarre  Preoccupations  [] none, [] violence, [] obsessions, [] other     Suicidal ideation  [x] denies, [] endorses  Homicidal ideation [x] denies, [] endorses  Thought process: [x] blocking, [] circumstantial, [] tangential     [] concrete, [] disorganized  Associations:  [] logical and coherent, [x] loosening, [] disorganized  Insight:   [] good, [] fair, [x] poor  Judgment:  [] good, [] fair, [x] poor  Attention and concentration:     [] intact, [x] limited, [] able to focus on interview,     [] grossly impaired  Orientation:  [x] person, place, time, situation     [] disoriented to:     Memory:  Remote memory [x] intact, [] impaired     Recent memory  [x] intact, [] impaired          Data  Labs:   Admission on 09/02/2022   Component Date Value Ref Range Status    TSH 09/03/2022 0.77  0.27 - 4.20 uIU/mL Final    Cholesterol, Total 09/03/2022 134  0 - 199 mg/dL Final    Triglycerides 09/03/2022 78  0 - 150 mg/dL Final    HDL 09/03/2022 38 (A) 40 - 60 mg/dL Final    Comment: An HDL cholesterol less than 40 mg/dL is low and  constitutes a coronary heart disease risk factor. An HDL cholesterol greater than 60 mg/dL is a  negative risk factor for coronary heart disease.       LDL Calculated 09/03/2022 80  <100 mg/dL Final    VLDL Cholesterol Calculated 09/03/2022 16  Not Established mg/dL Final    Hemoglobin A1C 09/03/2022 5.3  See comment % Final    Comment: Comment:  Diagnosis of Diabetes: > or = 6.5%  Increased risk of diabetes (Prediabetes): 5.7-6.4%  Glycemic Control: Nonpregnant Adults: <7.0%                    Pregnant: <6.0%        eAG 09/03/2022 105.4  mg/dL Final            Medications  Current Facility-Administered Medications: polyethylene glycol (GLYCOLAX) packet 17 g, 17 g, Oral, Daily  benztropine (COGENTIN) tablet 1 mg, 1 mg, Oral, Daily  benztropine (COGENTIN) tablet 2 mg, 2 mg, Oral, Nightly  fluPHENAZine HCl (PROLIXIN) tablet 10 mg, 10 mg, Oral, Nightly  OLANZapine (ZYPREXA) tablet 10 mg, 10 mg, Oral, BID  buPROPion (WELLBUTRIN XL) extended release tablet 150 mg, 150 mg, Oral, Daily  ibuprofen (ADVIL;MOTRIN) tablet 400 mg, 400 mg, Oral, Q6H PRN  magnesium hydroxide (MILK OF MAGNESIA) 400 MG/5ML suspension 30 mL, 30 mL, Oral, Daily PRN  nicotine polacrilex (COMMIT) lozenge 2 mg, 2 mg, Oral, Q1H PRN  aluminum & magnesium hydroxide-simethicone (MAALOX) 200-200-20 MG/5ML suspension 30 mL, 30 mL, Oral, Q6H PRN  hydrOXYzine HCl (ATARAX) tablet 50 mg, 50 mg, Oral, TID PRN  OLANZapine (ZYPREXA) tablet 5 mg, 5 mg, Oral, Q6H PRN **OR** OLANZapine (ZYPREXA) 10 mg in sterile water 2 mL injection, 10 mg, IntraMUSCular, Q6H PRN  diphenhydrAMINE (BENADRYL) injection 50 mg, 50 mg, IntraMUSCular, Q4H PRN  traZODone (DESYREL) tablet 50 mg, 50 mg, Oral, Nightly PRN  ciprofloxacin (CILOXAN) 0.3 % ophthalmic solution 1 drop, 1 drop, Both Eyes, TID  carboxymethylcellulose PF (THERATEARS) 1 % ophthalmic gel 1 drop, 1 drop, Both Eyes, Q4H PRN    ASSESSMENT AND PLAN    Principal Problem:    Undifferentiated schizophrenia ([address]  Resolved Problems:    * No resolved hospital problems. *       1. Patient's symptoms show no change  2. Probable discharge is 3-5 days  3. Discharge planning is incomplete  4.  Suicidal ideation is  absent    Plan:    Increase prolixin to 10 mg BID PO  Continue other meds

## 2022-09-05 NOTE — PLAN OF CARE
Problem: Coping  Goal: Pt/Family able to verbalize concerns and demonstrate effective coping strategies  Description: INTERVENTIONS:  1. Assist patient/family to identify coping skills, available support systems and cultural and spiritual values  2. Provide emotional support, including active listening and acknowledgement of concerns of patient and caregivers  3. Reduce environmental stimuli, as able  4. Instruct patient/family in relaxation techniques, as appropriate  5. Assess for spiritual pain/suffering and initiate Spiritual Care, Psychosocial Clinical Specialist consults as needed  9/5/2022 0002 by Deana Clark RN  Outcome: Progressing     Problem: Decision Making  Goal: Pt/Family able to effectively weigh alternatives and participate in decision making related to treatment and care  Description: INTERVENTIONS:  1. Determine when there are differences between patient's view, family's view, and healthcare provider's view of condition  2. Facilitate patient and family articulation of goals for care  3. Help patient and family identify pros/cons of alternative solutions  4. Provide information as requested by patient/family  5. Respect patient/family right to receive or not to receive information  6. Serve as a liaison between patient and family and health care team  7. Initiate Consults from Ethics, Palliative Care or initiate [facility] as is appropriate  9/5/2022 0002 by Deana Clark RN  Outcome: Progressing     Problem: Behavior  Goal: Pt/Family maintain appropriate behavior and adhere to behavioral management agreement, if implemented  Description: INTERVENTIONS:  1. Assess patient/family's coping skills and  non-compliant behavior (including use of illegal substances)  2. Notify security of behavior or suspected illegal substances which indicate the need for search of the family and/or belongings  3. Encourage verbalization of thoughts and concerns in a socially appropriate manner  4. Utilize positive, consistent limit setting strategies supporting safety of patient, staff and others  5. Encourage participation in the decision making process about the behavioral management agreement  6. If a visitor's behavior poses a threat to safety call refer to organization policy. 7. Initiate consult with , Psychosocial CNS, Spiritual Care as appropriate  9/4/2022 1118 by Francella Sandifer, RN  Outcome: Progressing     Problem: Depression/Self Harm  Goal: Effect of psychiatric condition will be minimized and patient will be protected from self harm  Description: INTERVENTIONS:  1. Assess impact of patient's symptoms on level of functioning, self care needs and offer support as indicated  2. Assess patient/family knowledge of depression, impact on illness and need for teaching  3. Provide emotional support, presence and reassurance  4. Assess for possible suicidal thoughts or ideation. If patient expresses suicidal thoughts or statements do not leave alone, initiate Suicide Precautions, move to a room close to the nursing station and obtain sitter  5. Initiate consults as appropriate with Mental Health Professional, Spiritual Care, Psychosocial CNS, and consider a recommendation to the LIP for a Psychiatric Consultation  9/5/2022 0002 by Cas Mendoza RN  Outcome: Progressing     Problem: Anxiety  Goal: Will report anxiety at manageable levels  Description: INTERVENTIONS:  1. Administer medication as ordered  2. Teach and rehearse alternative coping skills  3.  Provide emotional support with 1:1 interaction with staff  9/5/2022 0002 by Cas Mendoza RN  Outcome: Progressing  9/4/2022 1118 by Francella Sandifer, RN  Outcome: Not Progressing  Flowsheets (Taken 9/4/2022 1110)  Will report anxiety at manageable levels:   Administer medication as ordered   Provide emotional support with 1:1 interaction with staff   Teach and rehearse alternative coping skills   Patient was noticed to be upset at the beginning of the shift. He was offered and accepted prn medication to help him cope with being in the hospital. He denied SI/HI,A/V hallucinations. He is cooperative. He is medication compliant. He was initially agitated at the beginning of the shift. He was able to go to his room and talked about anything that might be on his mind tonight. He is asked to come to the staff if thoughts of self harm were to occur. He agrees to do this.

## 2022-09-05 NOTE — PROGRESS NOTES
Atul Mullen asked to see me again. Says he is having a restless sensation in his legs that makes him want to pace. Prolixin will be kept at 10 mg daily rather than increase to 10 mg BID and propranolol 10 mg TID will be started for akathisia.

## 2022-09-05 NOTE — PROGRESS NOTES
Patient refused dose of ciprofloxacin ophthalmic drops. Patient stated, \"I cleaned it with soap and water and it feels better. I do not want the drops right now. \"  Patient educated on the need for the antibiotics. Patient continued to refuse.

## 2022-09-05 NOTE — PLAN OF CARE
Problem: Anxiety  Goal: Will report anxiety at manageable levels  Description: INTERVENTIONS:  1. Administer medication as ordered  2. Teach and rehearse alternative coping skills  3. Provide emotional support with 1:1 interaction with staff  9/5/2022 1315 by Matthew Corbin RN  Outcome: Progressing     Problem: Behavior  Goal: Pt/Family maintain appropriate behavior and adhere to behavioral management agreement, if implemented  Description: INTERVENTIONS:  1. Assess patient/family's coping skills and  non-compliant behavior (including use of illegal substances)  2. Notify security of behavior or suspected illegal substances which indicate the need for search of the family and/or belongings  3. Encourage verbalization of thoughts and concerns in a socially appropriate manner  4. Utilize positive, consistent limit setting strategies supporting safety of patient, staff and others  5. Encourage participation in the decision making process about the behavioral management agreement  6. If a visitor's behavior poses a threat to safety call refer to organization policy. 7. Initiate consult with , Psychosocial CNS, Spiritual Care as appropriate  9/5/2022 1315 by Matthew Corbin RN  Outcome: Progressing     Problem: Psychosis  Goal: Will report no hallucinations or delusions  Description: INTERVENTIONS:  1. Administer medication as  ordered  2. Assist with reality testing to support increasing orientation  3. Assess if patient's hallucinations or delusions are encouraging self harm or harm to others and intervene as appropriate  Outcome: Progressing   Pt has been visible on the unit. He is at desk frequently asking questions. He is sometimes appropriate and sometimes tangential and disorganized. He denies suicidal or homicidal ideations. He denies having hallucinations at present time but states he did have them as a child. Overall, pt presents guarded and flat.   When speaking on phone with grandparents he did carry on an appropriate conversation. Pt due to start inderal for anxiety this early afternoon. Still intermittently laughing loudly and inappropriately.

## 2022-09-05 NOTE — PLAN OF CARE
including medications, impaired vision or hearing, underlying metabolic abnormalities, dehydration, psychiatric diagnoses, and notify attending LIP  2. [place] high risk fall precautions, as indicated  3. Provide frequent short contacts to provide reality reorientation, refocusing and direction  4. Decrease environmental stimuli, including noise as appropriate  5. Monitor and intervene to maintain adequate nutrition, hydration, elimination, sleep and activity  6. If unable to ensure safety without constant attention obtain sitter and review sitter guidelines with assigned personnel  7. Initiate Psychosocial CNS and Spiritual Care consult, as indicated  Outcome: Progressing   Patient denied SI/HI,A/V hallucinations this evening. He was some what agitated this evening and received trazodone and ativan early in the shift with good results. He was medicated for pain and the medication was effective. He is asked to come to the team station for any needs and he agreed to do this. He is asked to notify staff as soon as possible if thoughts of self harm were to occur. He agreed to do this. Safety checks contunue Q 15 minutes through out the shift.

## 2022-09-05 NOTE — PROGRESS NOTES
Patient was offered and accepted trazodone 50  mg po to promote sleep, and also received ativan 2 mg po as a one time order to help patient decrease his anxiety and some agitation. Will monitor effectiveness.

## 2022-09-06 PROCEDURE — 6370000000 HC RX 637 (ALT 250 FOR IP)

## 2022-09-06 PROCEDURE — 1240000000 HC EMOTIONAL WELLNESS R&B

## 2022-09-06 PROCEDURE — 6370000000 HC RX 637 (ALT 250 FOR IP): Performed by: PSYCHIATRY & NEUROLOGY

## 2022-09-06 PROCEDURE — 99233 SBSQ HOSP IP/OBS HIGH 50: CPT | Performed by: PSYCHIATRY & NEUROLOGY

## 2022-09-06 RX ADMIN — OLANZAPINE 10 MG: 10 TABLET, FILM COATED ORAL at 09:36

## 2022-09-06 RX ADMIN — OLANZAPINE 5 MG: 5 TABLET, FILM COATED ORAL at 18:52

## 2022-09-06 RX ADMIN — POLYETHYLENE GLYCOL (3350) 17 G: 17 POWDER, FOR SOLUTION ORAL at 09:36

## 2022-09-06 RX ADMIN — CIPROFLOXACIN 1 DROP: 3 SOLUTION OPHTHALMIC at 20:39

## 2022-09-06 RX ADMIN — BENZTROPINE MESYLATE 2 MG: 1 TABLET ORAL at 20:40

## 2022-09-06 RX ADMIN — BUPROPION HYDROCHLORIDE 150 MG: 150 TABLET ORAL at 09:36

## 2022-09-06 RX ADMIN — PROPRANOLOL HYDROCHLORIDE 10 MG: 10 TABLET ORAL at 09:36

## 2022-09-06 RX ADMIN — TRAZODONE HYDROCHLORIDE 50 MG: 50 TABLET ORAL at 20:39

## 2022-09-06 RX ADMIN — OLANZAPINE 10 MG: 10 TABLET, FILM COATED ORAL at 20:39

## 2022-09-06 RX ADMIN — BENZTROPINE MESYLATE 1 MG: 1 TABLET ORAL at 09:36

## 2022-09-06 RX ADMIN — PROPRANOLOL HYDROCHLORIDE 10 MG: 10 TABLET ORAL at 20:39

## 2022-09-06 RX ADMIN — FLUPHENAZINE HYDROCHLORIDE 10 MG: 10 TABLET, FILM COATED ORAL at 09:36

## 2022-09-06 RX ADMIN — PROPRANOLOL HYDROCHLORIDE 10 MG: 10 TABLET ORAL at 13:24

## 2022-09-06 NOTE — PROGRESS NOTES
Patient pacing the unit, stating, \"I am feeling agitated, I need something now. \"  Patient offered PRN medication. Patient agreeable. Zyprexa given per order.   See [facility]

## 2022-09-06 NOTE — PLAN OF CARE
Pt denies all. Visible and social on unit. Labile and still disorganized. + med's. Problem: Anxiety  Goal: Will report anxiety at manageable levels  Description: INTERVENTIONS:  1. Administer medication as ordered  2. Teach and rehearse alternative coping skills  3. Provide emotional support with 1:1 interaction with staff  9/5/2022 2016 by Saulo Joya RN  Outcome: Progressing  9/5/2022 1315 by Gosia Guzman RN  Outcome: Progressing     Problem: Coping  Goal: Pt/Family able to verbalize concerns and demonstrate effective coping strategies  Description: INTERVENTIONS:  1. Assist patient/family to identify coping skills, available support systems and cultural and spiritual values  2. Provide emotional support, including active listening and acknowledgement of concerns of patient and caregivers  3. Reduce environmental stimuli, as able  4. Instruct patient/family in relaxation techniques, as appropriate  5. Assess for spiritual pain/suffering and initiate Spiritual Care, Psychosocial Clinical Specialist consults as needed  Outcome: Progressing     Problem: Decision Making  Goal: Pt/Family able to effectively weigh alternatives and participate in decision making related to treatment and care  Description: INTERVENTIONS:  1. Determine when there are differences between patient's view, family's view, and healthcare provider's view of condition  2. Facilitate patient and family articulation of goals for care  3. Help patient and family identify pros/cons of alternative solutions  4. Provide information as requested by patient/family  5. Respect patient/family right to receive or not to receive information  6. Serve as a liaison between patient and family and health care team  7.  Initiate Consults from Ethics, Palliative Care or initiate [facility] as is appropriate  Outcome: Progressing     Problem: Confusion  Goal: Confusion, delirium, dementia, or psychosis is improved or at baseline  Description: INTERVENTIONS:  1. Assess for possible contributors to thought disturbance, including medications, impaired vision or hearing, underlying metabolic abnormalities, dehydration, psychiatric diagnoses, and notify attending LIP  2. [place] high risk fall precautions, as indicated  3. Provide frequent short contacts to provide reality reorientation, refocusing and direction  4. Decrease environmental stimuli, including noise as appropriate  5. Monitor and intervene to maintain adequate nutrition, hydration, elimination, sleep and activity  6. If unable to ensure safety without constant attention obtain sitter and review sitter guidelines with assigned personnel  7. Initiate Psychosocial CNS and Spiritual Care consult, as indicated  Outcome: Progressing     Problem: Behavior  Goal: Pt/Family maintain appropriate behavior and adhere to behavioral management agreement, if implemented  Description: INTERVENTIONS:  1. Assess patient/family's coping skills and  non-compliant behavior (including use of illegal substances)  2. Notify security of behavior or suspected illegal substances which indicate the need for search of the family and/or belongings  3. Encourage verbalization of thoughts and concerns in a socially appropriate manner  4. Utilize positive, consistent limit setting strategies supporting safety of patient, staff and others  5. Encourage participation in the decision making process about the behavioral management agreement  6. If a visitor's behavior poses a threat to safety call refer to organization policy. 7. Initiate consult with , Psychosocial CNS, Spiritual Care as appropriate  9/5/2022 2016 by Daniel Hughes RN  Outcome: Progressing  9/5/2022 1315 by Lorrie Gamboa RN  Outcome: Progressing     Problem: Depression/Self Harm  Goal: Effect of psychiatric condition will be minimized and patient will be protected from self harm  Description: INTERVENTIONS:  1.  Assess impact of patient's symptoms on level of functioning, self care needs and offer support as indicated  2. Assess patient/family knowledge of depression, impact on illness and need for teaching  3. Provide emotional support, presence and reassurance  4. Assess for possible suicidal thoughts or ideation. If patient expresses suicidal thoughts or statements do not leave alone, initiate Suicide Precautions, move to a room close to the nursing station and obtain sitter  5. Initiate consults as appropriate with Mental Health Professional, Spiritual Care, Psychosocial CNS, and consider a recommendation to the LIP for a Psychiatric Consultation  Outcome: Progressing     Problem: Drug Abuse/Detox  Goal: Will have no detox symptoms and will verbalize plan for changing drug-related behavior  Description: INTERVENTIONS:  1. Administer medication as ordered  2. Monitor physical status  3. Provide emotional support with 1:1 interaction with staff  4. Encourage  recovery focused treatment   Outcome: Progressing     Problem: Involuntary Admit  Goal: Will cooperate with staff recommendations and doctor's orders and will demonstrate appropriate behavior  Description: INTERVENTIONS:  1. Treat underlying conditions and offer medication as ordered  2. Educate regarding involuntary admission procedures and rules  3. Contain excessive/inappropriate behavior per unit and hospital policies  Outcome: Progressing     Problem: Chronic Conditions and Co-morbidities  Goal: Patient's chronic conditions and co-morbidity symptoms are monitored and maintained or improved  Outcome: Progressing     Problem: Infection - Adult  Goal: Absence of infection at discharge  Outcome: Progressing  Goal: Absence of fever/infection during anticipated neutropenic period  Outcome: Progressing     Problem: Layla  Goal: Will exhibit normal sleep and speech and no impulsivity  Description: INTERVENTIONS:  1. Administer medication as ordered  2. Set limits on impulsive behavior  3.  Make attempts to decrease external stimuli as possible  Outcome: Progressing     Problem: Psychosis  Goal: Will report no hallucinations or delusions  Description: INTERVENTIONS:  1. Administer medication as  ordered  2. Assist with reality testing to support increasing orientation  3.  Assess if patient's hallucinations or delusions are encouraging self harm or harm to others and intervene as appropriate  9/5/2022 2016 by Jaz Griggs RN  Outcome: Progressing  9/5/2022 1315 by Newton Quick RN  Outcome: Progressing     Problem: Pain  Goal: Verbalizes/displays adequate comfort level or baseline comfort level  Outcome: Progressing

## 2022-09-06 NOTE — PROGRESS NOTES
Pt has been visible in milieu, isolative to self. Laughing to self and watching television. Has declined to attend groups. Will continue to monitor.

## 2022-09-06 NOTE — PROGRESS NOTES
Patient has been up ad feli, visible in milieu and isolative to self. He attended groups with minimal participation. During shift assessment pt was very guarded and withdrawn, selectively mute. He did state that he was feeling great. He was hesitant to take medications but with much encouragement he was compliant. Will continue to monitor for safety and comfort.

## 2022-09-06 NOTE — PLAN OF CARE
[facility]  Treatment Team Note  Review Date & Time: 09/06/22  0900    Patient was not in treatment team      Status EXAM:   Mental Status and Behavioral Exam  Normal: No  Level of Assistance: Independent/Self  Facial Expression: Flat, Expressionless  Affect: Constricted  Level of Consciousness: Alert  Frequency of Checks: 4 times per hour, close  Mood:Normal: No  Mood: Empty  Motor Activity:Normal: Yes  Motor Activity: Increased  Eye Contact: Good  Observed Behavior: Guarded, Withdrawn  Sexual Misconduct History: Current - no  Preception: [place] to person, [place] to time, [place] to place  Attention:Normal: No  Attention: Distractible  Thought Processes: Blocking  Thought Content:Normal: No  Thought Content: Poverty of content  Depression Symptoms: No problems reported or observed. Anxiety Symptoms: No problems reported or observed. Layla Symptoms: No problems reported or observed. Hallucinations: None  Delusions: No  Delusions: [place]  Memory:Normal: No  Memory: Poor recent, Poor remote  Insight and Judgment: No  Insight and Judgment: Poor judgment, Poor insight      Suicide Risk CSSR-S:  1) Within the past month, have you wished you were dead or wished you could go to sleep and not wake up? : No  2) Have you actually had any thoughts of killing yourself? : No  6) Have you ever done anything, started to do anything, or prepared to do anything to end your life?: No      PLAN/TREATMENT RECOMMENDATIONS UPDATE:   Patient will take medication as prescribed, eat 75% of meals, attend groups, participate in milieu activities, participate in treatment team and care planning for discharge and follow up.            Juan Doshi RN

## 2022-09-06 NOTE — PROGRESS NOTES
Department of Psychiatry  AttendingProgress Note  Chief Complaint: psychosis  Olvin Londono has been relatively compliant bt appears rather concrete with his comments and understanding of his illness. He was focused on not sleeping well and felt drowsy today . Is hopeful to go  home soon. Tolerated his prolixin dec last week. He may be developmentally delayed which would explain his concrete presentation. Aniticipate dc tomorrow with follow up at [place] [id number] Dr. Neel Garza  Patient's chart was reviewed and collaborated with  about the treatment plan. SUBJECTIVE:    Patient is feeling better. Suicidal ideation:  denies suicidal ideation. Patient does not have medication side effects. ROS: Patient has new complaints: no  Sleeping adequately:  No:    Appetite adequate: Yes  Attending groups: Yes  Visitors:No    OBJECTIVE    Physical  VITALS:  /82   Pulse 96   Temp 98.4 °F (36.9 °C) (Temporal)   Resp 16   Ht 5' 9\" (1.753 m)   Wt 204 lb (92.5 kg)   SpO2 97%   BMI 30.13 kg/m²     Mental Status Examination:  Patients appearance was ill-appearing. Thoughts are [place]. Homicidal ideations none. No abnormal movements, tics or mannerisms. Memory intact [place] 0. Concentration Fair. Alert and oriented X 4. Insight and Judgement impaired insight. Patient was cooperative. Patient gait normal. Mood irritable, affect labile affect Hallucinations Absent, suicidal ideations no specific plan to harm self Speech normal volume  Data  Labs:   Admission on 09/02/2022   Component Date Value Ref Range Status    TSH 09/03/2022 0.77  0.27 - 4.20 uIU/mL Final    Cholesterol, Total 09/03/2022 134  0 - 199 mg/dL Final    Triglycerides 09/03/2022 78  0 - 150 mg/dL Final    HDL 09/03/2022 38 (A) 40 - 60 mg/dL Final    Comment: An HDL cholesterol less than 40 mg/dL is low and  constitutes a coronary heart disease risk factor. An HDL cholesterol greater than 60 mg/dL is a  negative risk factor for coronary heart disease. LDL Calculated 09/03/2022 80  <100 mg/dL Final    VLDL Cholesterol Calculated 09/03/2022 16  Not Established mg/dL Final    Hemoglobin A1C 09/03/2022 5.3  See comment % Final    Comment: Comment:  Diagnosis of Diabetes: > or = 6.5%  Increased risk of diabetes (Prediabetes): 5.7-6.4%  Glycemic Control: Nonpregnant Adults: <7.0%                    Pregnant: <6.0%        eAG 09/03/2022 105.4  mg/dL Final            Medications  Current Facility-Administered Medications: fluPHENAZine HCl (PROLIXIN) tablet 10 mg, 10 mg, Oral, Daily  propranolol (INDERAL) tablet 10 mg, 10 mg, Oral, TID  polyethylene glycol (GLYCOLAX) packet 17 g, 17 g, Oral, Daily  benztropine (COGENTIN) tablet 1 mg, 1 mg, Oral, Daily  benztropine (COGENTIN) tablet 2 mg, 2 mg, Oral, Nightly  OLANZapine (ZYPREXA) tablet 10 mg, 10 mg, Oral, BID  buPROPion (WELLBUTRIN XL) extended release tablet 150 mg, 150 mg, Oral, Daily  ibuprofen (ADVIL;MOTRIN) tablet 400 mg, 400 mg, Oral, Q6H PRN  magnesium hydroxide (MILK OF MAGNESIA) 400 MG/5ML suspension 30 mL, 30 mL, Oral, Daily PRN  nicotine polacrilex (COMMIT) lozenge 2 mg, 2 mg, Oral, Q1H PRN  aluminum & magnesium hydroxide-simethicone (MAALOX) 200-200-20 MG/5ML suspension 30 mL, 30 mL, Oral, Q6H PRN  hydrOXYzine HCl (ATARAX) tablet 50 mg, 50 mg, Oral, TID PRN  OLANZapine (ZYPREXA) tablet 5 mg, 5 mg, Oral, Q6H PRN **OR** OLANZapine (ZYPREXA) 10 mg in sterile water 2 mL injection, 10 mg, IntraMUSCular, Q6H PRN  diphenhydrAMINE (BENADRYL) injection 50 mg, 50 mg, IntraMUSCular, Q4H PRN  traZODone (DESYREL) tablet 50 mg, 50 mg, Oral, Nightly PRN  ciprofloxacin (CILOXAN) 0.3 % ophthalmic solution 1 drop, 1 drop, Both Eyes, TID  carboxymethylcellulose PF (THERATEARS) 1 % ophthalmic gel 1 drop, 1 drop, Both Eyes, Q4H PRN    ASSESSMENT AND PLAN    Principal Problem:    Undifferentiated schizophrenia (HCC)  Resolved Problems:    * No resolved hospital problems. *       1. Patient s symptoms   are improving  2. Probable discharge is tomorrow  3. Discharge planning is complete  4. Suicidal ideation is  none  5. Total time with patient was 40 minutes and more than 50 % of that time was spent counseling the patient on their symptoms, treatment and expected goals.

## 2022-09-07 VITALS
WEIGHT: 204 LBS | OXYGEN SATURATION: 99 % | HEART RATE: 78 BPM | BODY MASS INDEX: 30.21 KG/M2 | RESPIRATION RATE: 16 BRPM | HEIGHT: 69 IN | TEMPERATURE: 97.7 F | SYSTOLIC BLOOD PRESSURE: 95 MMHG | DIASTOLIC BLOOD PRESSURE: 63 MMHG

## 2022-09-07 PROCEDURE — 5130000000 HC BRIDGE APPOINTMENT

## 2022-09-07 PROCEDURE — 99239 HOSP IP/OBS DSCHRG MGMT >30: CPT | Performed by: PSYCHIATRY & NEUROLOGY

## 2022-09-07 RX ORDER — PROPRANOLOL HYDROCHLORIDE 10 MG/1
10 TABLET ORAL 3 TIMES DAILY
Qty: 90 TABLET | Refills: 3 | Status: SHIPPED | OUTPATIENT
Start: 2022-09-07

## 2022-09-07 RX ORDER — FLUPHENAZINE DECANOATE 25 MG/ML
50 INJECTION, SOLUTION INTRAMUSCULAR; SUBCUTANEOUS ONCE
Qty: 2 ML | Refills: 0 | Status: SHIPPED | OUTPATIENT
Start: 2022-09-21 | End: 2022-09-21

## 2022-09-07 RX ORDER — FLUPHENAZINE HYDROCHLORIDE 10 MG/1
10 TABLET ORAL DAILY
Qty: 60 TABLET | Refills: 0 | Status: SHIPPED | OUTPATIENT
Start: 2022-09-07

## 2022-09-07 RX ORDER — BENZTROPINE MESYLATE 2 MG/1
2 TABLET ORAL NIGHTLY
Qty: 60 TABLET | Refills: 3 | Status: SHIPPED | OUTPATIENT
Start: 2022-09-07

## 2022-09-07 RX ORDER — CIPROFLOXACIN HYDROCHLORIDE 3.5 MG/ML
1 SOLUTION/ DROPS TOPICAL 3 TIMES DAILY
Qty: 1 EACH | Refills: 0 | Status: SHIPPED | OUTPATIENT
Start: 2022-09-07 | End: 2022-09-17

## 2022-09-07 RX ORDER — BUPROPION HYDROCHLORIDE 150 MG/1
150 TABLET ORAL DAILY
Qty: 30 TABLET | Refills: 3 | Status: SHIPPED | OUTPATIENT
Start: 2022-09-07

## 2022-09-07 RX ORDER — BENZTROPINE MESYLATE 1 MG/1
1 TABLET ORAL DAILY
Qty: 60 TABLET | Refills: 3 | Status: SHIPPED | OUTPATIENT
Start: 2022-09-07

## 2022-09-07 NOTE — PLAN OF CARE
Nursing shift report 1900 to present- Patient was visible on the unit in the evening. His affect was flat with some thought blocking and he appeared preoccupied at times although he denied AVH, SI and HI. He made somatic delusional statements asking for a CT of his head stating that he had a large dent and hole in the back of his skull where he got hit by a hammer. He did take all his scheduled HS medications and also received trazodone 50 mg at 2040 for sleep which appears to have been effective. His right sclera has slight erythema with no drainage from conjunctivitis. He did not complain of pain from the partial detachment of his left great tow nail which he stated came off when he was doing the moon dance and his toenail caught on his sock. No unsafe behaviors noted. Safety checks continue. Problem: Anxiety  Goal: Will report anxiety at manageable levels  Description: INTERVENTIONS:  1. Administer medication as ordered  2. Teach and rehearse alternative coping skills  3. Provide emotional support with 1:1 interaction with staff  Outcome: Progressing     Problem: Behavior  Goal: Pt/Family maintain appropriate behavior and adhere to behavioral management agreement, if implemented  Description: INTERVENTIONS:  1. Assess patient/family's coping skills and  non-compliant behavior (including use of illegal substances)  2. Notify security of behavior or suspected illegal substances which indicate the need for search of the family and/or belongings  3. Encourage verbalization of thoughts and concerns in a socially appropriate manner  4. Utilize positive, consistent limit setting strategies supporting safety of patient, staff and others  5. Encourage participation in the decision making process about the behavioral management agreement  6. If a visitor's behavior poses a threat to safety call refer to organization policy.   7. Initiate consult with , Psychosocial CNS, Spiritual Care as appropriate  Outcome: Progressing     Problem: Depression/Self Harm  Goal: Effect of psychiatric condition will be minimized and patient will be protected from self harm  Description: INTERVENTIONS:  1. Assess impact of patient's symptoms on level of functioning, self care needs and offer support as indicated  2. Assess patient/family knowledge of depression, impact on illness and need for teaching  3. Provide emotional support, presence and reassurance  4. Assess for possible suicidal thoughts or ideation. If patient expresses suicidal thoughts or statements do not leave alone, initiate Suicide Precautions, move to a room close to the nursing station and obtain sitter  5.  Initiate consults as appropriate with Mental Health Professional, Spiritual Care, Psychosocial CNS, and consider a recommendation to the LIP for a Psychiatric Consultation  Outcome: Progressing

## 2022-09-07 NOTE — PLAN OF CARE
Problem: Anxiety  Goal: Will report anxiety at manageable levels  Description: INTERVENTIONS:  1. Administer medication as ordered  2. Teach and rehearse alternative coping skills  3. Provide emotional support with 1:1 interaction with staff  9/7/2022 1041 by Max Hernandez RN  Outcome: Adequate for Discharge  9/7/2022 0257 by Noe Jenkins RN  Outcome: Progressing     Problem: Coping  Goal: Pt/Family able to verbalize concerns and demonstrate effective coping strategies  Description: INTERVENTIONS:  1. Assist patient/family to identify coping skills, available support systems and cultural and spiritual values  2. Provide emotional support, including active listening and acknowledgement of concerns of patient and caregivers  3. Reduce environmental stimuli, as able  4. Instruct patient/family in relaxation techniques, as appropriate  5. Assess for spiritual pain/suffering and initiate Spiritual Care, Psychosocial Clinical Specialist consults as needed  Outcome: Adequate for Discharge     Problem: Decision Making  Goal: Pt/Family able to effectively weigh alternatives and participate in decision making related to treatment and care  Description: INTERVENTIONS:  1. Determine when there are differences between patient's view, family's view, and healthcare provider's view of condition  2. Facilitate patient and family articulation of goals for care  3. Help patient and family identify pros/cons of alternative solutions  4. Provide information as requested by patient/family  5. Respect patient/family right to receive or not to receive information  6. Serve as a liaison between patient and family and health care team  7. Initiate Consults from Ethics, Palliative Care or initiate [facility] as is appropriate  Outcome: Adequate for Discharge     Problem: Confusion  Goal: Confusion, delirium, dementia, or psychosis is improved or at baseline  Description: INTERVENTIONS:  1.  Assess for possible contributors to thought disturbance, including medications, impaired vision or hearing, underlying metabolic abnormalities, dehydration, psychiatric diagnoses, and notify attending LIP  2. [place] high risk fall precautions, as indicated  3. Provide frequent short contacts to provide reality reorientation, refocusing and direction  4. Decrease environmental stimuli, including noise as appropriate  5. Monitor and intervene to maintain adequate nutrition, hydration, elimination, sleep and activity  6. If unable to ensure safety without constant attention obtain sitter and review sitter guidelines with assigned personnel  7. Initiate Psychosocial CNS and Spiritual Care consult, as indicated  Outcome: Adequate for Discharge     Problem: Behavior  Goal: Pt/Family maintain appropriate behavior and adhere to behavioral management agreement, if implemented  Description: INTERVENTIONS:  1. Assess patient/family's coping skills and  non-compliant behavior (including use of illegal substances)  2. Notify security of behavior or suspected illegal substances which indicate the need for search of the family and/or belongings  3. Encourage verbalization of thoughts and concerns in a socially appropriate manner  4. Utilize positive, consistent limit setting strategies supporting safety of patient, staff and others  5. Encourage participation in the decision making process about the behavioral management agreement  6. If a visitor's behavior poses a threat to safety call refer to organization policy. 7. Initiate consult with , Psychosocial CNS, Spiritual Care as appropriate  9/7/2022 1041 by Hamida Juarez RN  Outcome: Adequate for Discharge  9/7/2022 0257 by Norma Schulz RN  Outcome: Progressing     Problem: Depression/Self Harm  Goal: Effect of psychiatric condition will be minimized and patient will be protected from self harm  Description: INTERVENTIONS:  1.  Assess impact of patient's symptoms on level of functioning, self care needs and offer support as indicated  2. Assess patient/family knowledge of depression, impact on illness and need for teaching  3. Provide emotional support, presence and reassurance  4. Assess for possible suicidal thoughts or ideation. If patient expresses suicidal thoughts or statements do not leave alone, initiate Suicide Precautions, move to a room close to the nursing station and obtain sitter  5. Initiate consults as appropriate with Mental Health Professional, Spiritual Care, Psychosocial CNS, and consider a recommendation to the LIP for a Psychiatric Consultation  9/7/2022 1041 by Luli Daniels RN  Outcome: Adequate for Discharge  9/7/2022 0257 by Hilda Leslie RN  Outcome: Progressing     Problem: Drug Abuse/Detox  Goal: Will have no detox symptoms and will verbalize plan for changing drug-related behavior  Description: INTERVENTIONS:  1. Administer medication as ordered  2. Monitor physical status  3. Provide emotional support with 1:1 interaction with staff  4. Encourage  recovery focused treatment   Outcome: Adequate for Discharge     Problem: Involuntary Admit  Goal: Will cooperate with staff recommendations and doctor's orders and will demonstrate appropriate behavior  Description: INTERVENTIONS:  1. Treat underlying conditions and offer medication as ordered  2. Educate regarding involuntary admission procedures and rules  3.  Contain excessive/inappropriate behavior per unit and hospital policies  Outcome: Adequate for Discharge     Problem: Chronic Conditions and Co-morbidities  Goal: Patient's chronic conditions and co-morbidity symptoms are monitored and maintained or improved  Outcome: Adequate for Discharge     Problem: Infection - Adult  Goal: Absence of infection at discharge  Outcome: Adequate for Discharge  Goal: Absence of fever/infection during anticipated neutropenic period  Outcome: Adequate for Discharge     Problem: Layla  Goal: Will

## 2022-09-07 NOTE — PROGRESS NOTES
[facility]  Discharge Note    Pt discharged with followings belongings:   Dental Appliances: None  Vision - Corrective Lenses: Contact Lenses (no currently with patient)  Hearing Aid: None  Jewelry: None  Body Piercings Removed: N/A  Clothing: Pants, Socks  Other Valuables: Wallet, Keys, Cigarettes, Other (Comment) (a single dollarbill  and state ID)   Valuables sent home with patient. Patient education on aftercare instructions: yes  Information faxed to LINDA SEYMOUR GRACIA by [phone]  at 2:58 PM .Patient verbalize understanding of AVS:  yes. Status EXAM upon discharge:  Mental Status and Behavioral Exam  Normal: No  Level of Assistance: Independent/Self  Facial Expression: Expressionless, Flat  Affect: Constricted  Level of Consciousness: Alert  Frequency of Checks: 4 times per hour, close  Mood:Normal: No  Mood: Empty  Motor Activity:Normal: Yes  Motor Activity: Increased  Eye Contact: Fair  Observed Behavior: Withdrawn  Sexual Misconduct History: Current - no  Preception: [place] to person, [place] to time, [place] to place  Attention:Normal: No  Attention: Distractible  Thought Processes: Blocking  Thought Content:Normal: Yes  Thought Content: Delusions  Depression Symptoms: No problems reported or observed. Anxiety Symptoms: No problems reported or observed. Layla Symptoms: No problems reported or observed.   Hallucinations: Unable to assess  Delusions: No  Delusions: Somatic  Memory:Normal: No  Memory: Poor recent, Poor remote  Insight and Judgment: No  Insight and Judgment: Poor insight, Poor judgment    Tobacco Screening:  Practical Counseling, on admission, kwadwo X, if applicable and completed (first 3 are required if patient doesn't refuse):            ( ) Recognizing danger situations (included triggers and roadblocks)                    ( ) Coping skills (new ways to manage stress,relaxation techniques, changing routine, distraction) ( ) Basic information about quitting (benefits of quitting, techniques in how to quit, available resources  ( ) Referral for counseling faxed to [facility]                                                                                                                   (X) Patient refused counseling  ( ) Patient refused referral  ( ) Patient refused prescription upon discharge  ( ) Patient has not smoked in the last 30 days    Metabolic Screening:    Lab Results   Component Value Date    LABA1C 5.3 09/03/2022       Lab Results   Component Value Date    CHOL 134 09/03/2022     Lab Results   Component Value Date    TRIG 78 09/03/2022     Lab Results   Component Value Date    HDL 38 (L) 09/03/2022     No components found for: [facility]  Lab Results   Component Value Date    LABVLDL 16 09/03/2022       Karin Brannon RN

## 2022-09-07 NOTE — DISCHARGE INSTRUCTIONS
Advanced Directives:  Patient does not have a surrogate decision maker appointed   Name (if yes): declined Phone Number:   Patient does not have a psychiatric and/ or medical advanced directive or power of . Patient was offered psychiatric and/ or medical advanced directive or power of  information/completion but declined to complete   Why not? declined    Discharge Planning is Complete. Discharge Date: 9/7/20212  Reason for Hospitalization: Failure to care for self  Discharge Diagnosis: Schizophrenia  Discharging to: Private Domicile    Your instructions: Your physician here was Yash Perry MD. If you have any questions please call the unit at [phone] for the adult unit or [phone] for [facility]. Please note that we have a patient family advisory [place] that meets the second Wednesday of January and the second Wednesday of July at [address] in [place] at [facility]. Department leadership would love for you to attend to give feedback on what we are doing well and areas in which we can improve our patient care. Please come if you are able and feel free to reach out to [place] [address] at [phone] with any questions. The crisis number for [place] is [phone]. You can use this number at any time to access emergency mental health services. Please follow up with your PCP regarding any pending labs.      Your next appointment is:  Name of Provider: [facility]  Provider specialty/license: Counseling  Date and time of appointment: Tuesday, September 13th, 2022, at 11:00 AM.  You will come here to do a zoom meeting with a provider in a different office  The type/s of services requested are: hospital follow up  Agency name: [facility]   Address: [address] [place] [address], [address] [place] [address]  Phone: [phone]  Special instructions (what to bring to appointment, etc.):      Other appointments:   Name of Provider: Anastasia GROVES-CNP  Provider specialty/license: psychiatry  Date and time of appointment: Monday, September 12th, 2022, at 10:00 AM  The type/s of services requested are:  Medicine management  Agency name: [facility] [[email or web address]]  Address: [address] [place] [address], Madie Carpio, [address]  Phone: [phone]  Special instructions (what to bring to appointment, etc.):     Discharge Completed By: Kunal OGDEN  Fax to: Follow up provider name and number  [facility] discharge [phone]    The following personal items were collected during your admission and were returned to you:    Belongings  Dental Appliances: None  Vision - Corrective Lenses: Contact Lenses (no currently with patient)  Hearing Aid: None  Clothing: Pants, Socks  Jewelry: None  Body Piercings Removed: N/A  Electronic Devices: None  Weapons (Notify Protective Services/Security): None  Other Valuables: Silveira Case, Cigarettes, Other (Comment) (a single dollarbill  and state ID)  Home Medications: None  Valuables Given To: Locker  Provide Name(s) of Who Valuable(s) Were Given To: in locker  Patient approves for provider to speak to responsible person post operatively: No    By signing below, I understand and acknowledge receipt of the instructions indicated above.

## 2022-09-07 NOTE — PROGRESS NOTES
Patient reported to a peer RN that his left great toenail came loose when he was doing the mood dance. The peer RN wrapped a gauze dressing around the toe till partially detached nail could be assessed by MD. Patient denied pain to this writer and his dressing did not have any drainage or blood on it when this writer assessed it. Patient with steady gait. No signs of distress.

## 2022-09-07 NOTE — GROUP NOTE
Group Therapy Note    Date: 9/6/2022    Group Start Time: 2100  Group End Time: 2120  Group Topic: Wrap-Up    [address]        Group Therapy Note    Attendees: 13       Patient's Goal:  pt did not have a goal.    Notes:  pt attended group, sat in the back and did not participate. Status After Intervention:  Unchanged    Participation Level: None    Participation Quality: Resistant      Speech:  pt did not communicate during group.       Thought Process/Content: pt did not talk during group       Affective Functioning: Constricted/Restricted      Mood:  shelton      Level of consciousness:  Alert      Response to Learning: Resistant      Endings: None Reported    Modes of Intervention: Support      Discipline Responsible: [address], [employer]      Signature:  Justin Miguel

## 2022-09-07 NOTE — PROGRESS NOTES
Pt refused to complete safety plan. Gives this writer a blank stare and walks away with each attempt.

## 2022-09-08 NOTE — DISCHARGE SUMMARY
Discharge Summary   Admit Date: 9/2/2022   Discharge Date:  9/7/2022    Condition at dc improved  Spent over 40 minutes with patient and staff on [address] with more than 50% of time spent with patient discussing care  Final Dx: axis I:   Undifferentiated schizophrenia ([address]     Axis 2: No diagnosis  Mary 3: See Medical History    And Present on Admission:   Undifferentiated schizophrenia ([address]     Axis 4: Problems related to the social environment  Axis 5:  On Admission: 31-40 impairment in reality testing At Discharge: 61-70 mild symptoms   All conditions on Axis 1 and Axis 2 and active problems on Axis 3 were treated while patient was hospitalized. 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 HISTORY OF PRESENT ILLNESS:  The patient is a 29-year-old male with a  history of schizophrenia and lives in [place], [place]. He was  brought to [facility] by police after he had some type of an  outburst in a front yard, screaming, was going to rip off people's  heads. He was seen in the ED and was cleared and transferred to [facility] for  treatment. When I asked the patient about why he was at [facility] [place] when he lives in  [place], he stated that he came there and was visiting family in [place]. It is not clear as to the details of that. Police had to come and get  him from that area for his behaviors. He stated that he has been in  outpatient treatment through [facility] and sees Dr. Evelia Molina,  a psychiatrist, and is prescribed a medication.   Apparently, he has been  off this medication for a month or so and has not followed up in  treatment as expected. The patient has minimal insight. He did not appear to understand why he  was in the hospital or what was going on that caused him to be in the  hospital.  He would act rather oddly at times and would suddenly burst  out laughing. When I asked him about drug use and alcohol use, he said  that he \"drank glue. \"  When I questioned that, he burst out laughing  very loudly and then said he was joking. He apparently does not have  any history of drug and alcohol abuse. Hospital Course  Patient stabilized on meds and milieu treatment. Received 50 mg Prolixin Decanoate IM   and continue that monthly. Prolixin p.o. 10 mg b.i.d., Zyprexa 10  mg daily, Cogentin 1 in the morning and 2 mg at night. 9/6 Evangelista Julio has been relatively compliant bt appears rather concrete with his comments and understanding of his illness. He was focused on not sleeping well and felt drowsy today . Is hopeful to go  home soon. Tolerated his prolixin dec last week. He may be developmentally delayed which would explain his concrete presentation. Aniticipate dc tomorrow with follow up at [place] [address] Dr. Luis Carlos Lim    Patient was discharged to home to continue recovery in the community. PE: (reviewed) and labs (see medical H&PE)  Labs:    Admission on 09/02/2022, Discharged on 09/07/2022   Component Date Value Ref Range Status    TSH 09/03/2022 0.77  0.27 - 4.20 uIU/mL Final    Cholesterol, Total 09/03/2022 134  0 - 199 mg/dL Final    Triglycerides 09/03/2022 78  0 - 150 mg/dL Final    HDL 09/03/2022 38 (A) 40 - 60 mg/dL Final    Comment: An HDL cholesterol less than 40 mg/dL is low and  constitutes a coronary heart disease risk factor. An HDL cholesterol greater than 60 mg/dL is a  negative risk factor for coronary heart disease.       LDL Calculated 09/03/2022 80  <100 mg/dL Final    VLDL Cholesterol Calculated 09/03/2022 16  Not Established mg/dL Final    Hemoglobin A1C 09/03/2022 5.3  See comment % Final Comment: Comment:  Diagnosis of Diabetes: > or = 6.5%  Increased risk of diabetes (Prediabetes): 5.7-6.4%  Glycemic Control: Nonpregnant Adults: <7.0%                    Pregnant: <6.0%        eAG 09/03/2022 105.4  mg/dL Final        Mental Status Exam at Discharge:  Level of consciousness:  awake  Appearance:  well-appearing, in chair, good grooming and good hygiene well-developed, well-nourished  Behavior/Motor:  no abnormalities noted normal gait and station AIMS: 0  Attitude toward examiner:  cooperative, attentive and good eye contact  Speech:  spontaneous, normal rate, normal volume and well articulated  Mood:  dysthymic  Affect:  mood congruent Anxiety: mild  Hallucinations: Absent  Thought processes:  coherent Attention span, Concentration & Attention:  attention span and concentration were age appropriate  Thought content:   no evidence of delusions OCD: none    Insight: limited insight and judgment Cognition:  oriented to person, place, and time  Fund of Knowledge: average  IQ:average Memory: intact  Suicide:  No specific plan to harm self  Sleep: sleeps through the night  Appetite: ok   Reassess Joyce Risk:  no specific plan to harm self Pt has phone numbers to contact if suicidal thoughts recur and states pt will return to the hospital if suicidal feelings return.    Hospital Routine Meds:     Hospital PRN Meds:    Discharge Meds:    Discharge Medication List as of 9/7/2022 12:17 PM             Details   buPROPion (WELLBUTRIN XL) 150 MG extended release tablet Take 1 tablet by mouth daily, Disp-30 tablet, R-3Normal      fluPHENAZine HCl (PROLIXIN) 10 MG tablet Take 1 tablet by mouth daily, Disp-60 tablet, R-0Normal      ciprofloxacin (CILOXAN) 0.3 % ophthalmic solution Place 1 drop into both eyes 3 times daily for 10 days, Disp-1 each, R-0Normal      fluPHENAZine decanoate (PROLIXIN) 25 MG/ML injection Inject 2 mLs into the muscle once for 1 dose, Disp-2 mL, R-0Normal                Details   !! benztropine (COGENTIN) 1 MG tablet Take 1 tablet by mouth daily, Disp-60 tablet, R-3Normal      !! benztropine (COGENTIN) 2 MG tablet Take 1 tablet by mouth at bedtime, Disp-60 tablet, R-3Normal      propranolol (INDERAL) 10 MG tablet Take 1 tablet by mouth 3 times daily, Disp-90 tablet, R-3Normal       !! - Potential duplicate medications found. Please discuss with provider.             Disposition - Residence Home    Follow Up:  See Discharge Instructions

## 2023-05-10 ENCOUNTER — HOSPITAL ENCOUNTER (EMERGENCY)
Age: 33
Discharge: HOME | End: 2023-05-11
Attending: EMERGENCY MEDICINE
Payer: MEDICARE

## 2023-05-10 DIAGNOSIS — Z04.9 CONDITION NOT FOUND: Primary | ICD-10-CM

## 2023-05-10 PROCEDURE — 99283 EMERGENCY DEPT VISIT LOW MDM: CPT

## 2023-05-11 VITALS
HEIGHT: 67 IN | RESPIRATION RATE: 16 BRPM | TEMPERATURE: 98.4 F | BODY MASS INDEX: 29.19 KG/M2 | WEIGHT: 186 LBS | OXYGEN SATURATION: 98 % | HEART RATE: 107 BPM | SYSTOLIC BLOOD PRESSURE: 146 MMHG | DIASTOLIC BLOOD PRESSURE: 97 MMHG

## 2023-05-11 VITALS
SYSTOLIC BLOOD PRESSURE: 122 MMHG | RESPIRATION RATE: 16 BRPM | DIASTOLIC BLOOD PRESSURE: 81 MMHG | TEMPERATURE: 98.4 F | HEART RATE: 96 BPM | OXYGEN SATURATION: 98 %

## 2023-05-11 DIAGNOSIS — Z76.0 ENCOUNTER FOR MEDICATION REFILL: Primary | ICD-10-CM

## 2023-05-11 DIAGNOSIS — F41.1 ANXIETY STATE: ICD-10-CM

## 2023-05-11 LAB
GLUCOSE BLD-MCNC: 118 MG/DL (ref 70–99)
PERFORMED ON: ABNORMAL

## 2023-05-11 PROCEDURE — 6370000000 HC RX 637 (ALT 250 FOR IP): Performed by: EMERGENCY MEDICINE

## 2023-05-11 PROCEDURE — 99283 EMERGENCY DEPT VISIT LOW MDM: CPT

## 2023-05-11 PROCEDURE — 93005 ELECTROCARDIOGRAM TRACING: CPT | Performed by: EMERGENCY MEDICINE

## 2023-05-11 RX ORDER — OLANZAPINE 10 MG/1
10 TABLET ORAL NIGHTLY
COMMUNITY

## 2023-05-11 RX ORDER — BENZTROPINE MESYLATE 1 MG/1
2 TABLET ORAL ONCE
Status: COMPLETED | OUTPATIENT
Start: 2023-05-11 | End: 2023-05-11

## 2023-05-11 RX ADMIN — BENZTROPINE MESYLATE 2 MG: 1 TABLET ORAL at 12:51

## 2023-05-11 ASSESSMENT — ENCOUNTER SYMPTOMS
SHORTNESS OF BREATH: 0
NAUSEA: 0
COUGH: 0
VOMITING: 0

## 2023-05-11 ASSESSMENT — PAIN - FUNCTIONAL ASSESSMENT: PAIN_FUNCTIONAL_ASSESSMENT: NONE - DENIES PAIN

## 2023-05-11 NOTE — ED NOTES
Pt was to anxious to re vital pt up and don out of bed walking around room.      Betti Frankel, LPN  43/11/38 0713

## 2023-05-11 NOTE — CARE COORDINATION
Referred to patient as he states he is out of his medications. States he lives in [place] and has not been able to  his Cogentin. Currently staying with his mother here in [place]. Patient reports grandmother is bringing his Cogentin tonight. Denies other d/c needs.   Electronically signed by ISMAEL Delatorre on 5/11/2023 at 12:57 PM

## 2023-05-11 NOTE — ED PROVIDER NOTES
Emergency Department Provider Note  Location: [facility]  ED  5/11/2023     Patient Identification  Leslye Layton is a 34 y.o. male    Chief Complaint  Anxiety (Per pt I'm anxious because I have not had my Cogentin I have not been to my Dr I can't sit still I Dora Heaton up\")          HPI  (History provided by patient)  Patient is a 30-year-old male with a history of schizophrenia who obtains his medications reportedly in [place] has been staying in [place] for the past few weeks with parent who reports he is out of his Cogentin. He was seen less than 24 hours ago in our emergency department and was discharged but now states that he is out of his Cogentin has not taken in the past 10 days. Compliant with other medications. States he feels anxious and fidgety cannot sit still. Denies any new symptoms. Specifically no SI HI hallucinations. No other physical complaints. States that he will have his medication this evening, grandma is driving it up to them. I have reviewed the following nursing documentation:  Allergies: Allergies   Allergen Reactions    Hydrocodone-Acetaminophen Nausea And Vomiting and Rash       Past medical history:  has a past medical history of Poly-drug misuser and Schizophrenia ([facility] [address] [facility] Past surgical history:  has a past surgical history that includes Mouth surgery. Home medications:   Prior to Admission medications    Medication Sig Start Date End Date Taking?  Authorizing Provider   OLANZapine (ZYPREXA) 10 MG tablet Take 1 tablet by mouth nightly   Yes Historical Provider, MD   buPROPion (WELLBUTRIN XL) 150 MG extended release tablet Take 1 tablet by mouth daily 9/7/22   Radha Jane MD   benztropine (COGENTIN) 1 MG tablet Take 1 tablet by mouth daily  Patient taking differently: Take 1 tablet by mouth daily Per pt I'm out of my med unable to get into see the Dr 9/7/22   Radha Jane MD   benztropine (COGENTIN) 2 MG tablet Take 1 tablet by mouth at

## 2023-05-11 NOTE — ED PROVIDER NOTES
mother/grandmother. Is this patient to be included in the SEP-1 Core Measure due to severe sepsis or septic shock? No   Exclusion criteria - the patient is NOT to be included for SEP-1 Core Measure due to: Infection is not suspected      CLINICAL IMPRESSION:     ICD-10-CM    1. Condition not found  Z04.9             DISPOSITION:  I discussed the results, including any incidental findings, with patient and through shared decision making;   Disposition today from the ED will be: Discharge to home in fair condition. Questions answered. They are agreeable to plan and express understanding of plan. Social Determinants : Patient has significant healthcare illiteracy      CRITICAL CARE:   Total critical care time is 0 minutes, which excludes separately billable procedures and updating family. Time spent is specifically for management of the presenting complaint and symptoms initially, direct bedside care, reevaluation, review of records, and consultation. There was a high probability of clinically significant life-threatening deterioration in the patient's condition, which required my urgent intervention. ____________________________________    DISCHARGE MEDICATIONS (if applicable):  Discharge Medication List as of 5/11/2023 12:38 AM          DISCONTINUED MEDICATIONS:  Discharge Medication List as of 5/11/2023 12:38 AM        STOP taking these medications       divalproex (DEPAKOTE) 500 MG DR tablet Comments:   Reason for Stopping:         risperiDONE (RISPERDAL) 2 MG tablet Comments:   Reason for Stopping:                    DISPOSITION REFERRAL (if applicable):  [facility]  ED  [address] [address]  [phone]    If symptoms worsen, As needed    Miguel Luque MD  [address] [place] [id number] [phone]    Schedule an appointment as soon as possible for a visit         _____________________________________      Kimberlyn Bowman DO,

## 2023-05-11 NOTE — ED NOTES
Patient left via personal vehicle to private residence in stable condition. Patients vitals were assessed before discharge and were stable. Patient verbalized understanding of discharge instructions, follow up and medications. RN explained information in discharge packet and patient verbalized they have no questions at this time. Patient left ER with all paperwork and belongings that RN is aware of.         Cathy Lazaro RN  05/11/23 7295

## 2023-05-12 LAB
EKG ATRIAL RATE: 105 BPM
EKG DIAGNOSIS: NORMAL
EKG P AXIS: 24 DEGREES
EKG P-R INTERVAL: 128 MS
EKG Q-T INTERVAL: 344 MS
EKG QRS DURATION: 86 MS
EKG QTC CALCULATION (BAZETT): 454 MS
EKG R AXIS: 57 DEGREES
EKG T AXIS: 27 DEGREES
EKG VENTRICULAR RATE: 105 BPM

## 2023-05-12 PROCEDURE — 93010 ELECTROCARDIOGRAM REPORT: CPT | Performed by: INTERNAL MEDICINE
